# Patient Record
Sex: FEMALE | Race: AMERICAN INDIAN OR ALASKA NATIVE
[De-identification: names, ages, dates, MRNs, and addresses within clinical notes are randomized per-mention and may not be internally consistent; named-entity substitution may affect disease eponyms.]

---

## 2018-08-05 ENCOUNTER — HOSPITAL ENCOUNTER (EMERGENCY)
Dept: HOSPITAL 5 - ED | Age: 38
Discharge: HOME | End: 2018-08-05
Payer: SELF-PAY

## 2018-08-05 VITALS — DIASTOLIC BLOOD PRESSURE: 67 MMHG | SYSTOLIC BLOOD PRESSURE: 118 MMHG

## 2018-08-05 DIAGNOSIS — O21.9: ICD-10-CM

## 2018-08-05 DIAGNOSIS — O99.511: ICD-10-CM

## 2018-08-05 DIAGNOSIS — J45.909: ICD-10-CM

## 2018-08-05 DIAGNOSIS — R10.33: ICD-10-CM

## 2018-08-05 DIAGNOSIS — Z3A.09: ICD-10-CM

## 2018-08-05 DIAGNOSIS — Z88.0: ICD-10-CM

## 2018-08-05 DIAGNOSIS — O26.891: Primary | ICD-10-CM

## 2018-08-05 DIAGNOSIS — R10.32: ICD-10-CM

## 2018-08-05 LAB
BACTERIA #/AREA URNS HPF: (no result) /HPF
BASOPHILS # (AUTO): 0 K/MM3 (ref 0–0.1)
BASOPHILS NFR BLD AUTO: 0.4 % (ref 0–1.8)
BILIRUB UR QL STRIP: (no result)
BLOOD UR QL VISUAL: (no result)
BUN SERPL-MCNC: 9 MG/DL (ref 7–17)
BUN/CREAT SERPL: 18 %
CALCIUM SERPL-MCNC: 9.5 MG/DL (ref 8.4–10.2)
EOSINOPHIL # BLD AUTO: 0.1 K/MM3 (ref 0–0.4)
EOSINOPHIL NFR BLD AUTO: 1.2 % (ref 0–4.3)
HCT VFR BLD CALC: 34.1 % (ref 30.3–42.9)
HEMOLYSIS INDEX: 0
HGB BLD-MCNC: 11.3 GM/DL (ref 10.1–14.3)
LYMPHOCYTES # BLD AUTO: 3 K/MM3 (ref 1.2–5.4)
LYMPHOCYTES NFR BLD AUTO: 25.2 % (ref 13.4–35)
MCH RBC QN AUTO: 32 PG (ref 28–32)
MCHC RBC AUTO-ENTMCNC: 33 % (ref 30–34)
MCV RBC AUTO: 97 FL (ref 79–97)
MONOCYTES # (AUTO): 1 K/MM3 (ref 0–0.8)
MONOCYTES % (AUTO): 8.2 % (ref 0–7.3)
MUCOUS THREADS #/AREA URNS HPF: (no result) /HPF
PH UR STRIP: 7 [PH] (ref 5–7)
PLATELET # BLD: 269 K/MM3 (ref 140–440)
PROT UR STRIP-MCNC: (no result) MG/DL
RBC # BLD AUTO: 3.52 M/MM3 (ref 3.65–5.03)
RBC #/AREA URNS HPF: 2 /HPF (ref 0–6)
UROBILINOGEN UR-MCNC: < 2 MG/DL (ref ?–2)
WBC #/AREA URNS HPF: 1 /HPF (ref 0–6)

## 2018-08-05 PROCEDURE — 76801 OB US < 14 WKS SINGLE FETUS: CPT

## 2018-08-05 PROCEDURE — 81025 URINE PREGNANCY TEST: CPT

## 2018-08-05 PROCEDURE — 81001 URINALYSIS AUTO W/SCOPE: CPT

## 2018-08-05 PROCEDURE — 36415 COLL VENOUS BLD VENIPUNCTURE: CPT

## 2018-08-05 PROCEDURE — 80048 BASIC METABOLIC PNL TOTAL CA: CPT

## 2018-08-05 PROCEDURE — 76817 TRANSVAGINAL US OBSTETRIC: CPT

## 2018-08-05 PROCEDURE — 99284 EMERGENCY DEPT VISIT MOD MDM: CPT

## 2018-08-05 PROCEDURE — 85025 COMPLETE CBC W/AUTO DIFF WBC: CPT

## 2018-08-05 PROCEDURE — 84702 CHORIONIC GONADOTROPIN TEST: CPT

## 2018-08-05 NOTE — EMERGENCY DEPARTMENT REPORT
HPI





- General


Chief Complaint: Abdominal Pain


Time Seen by Provider: 08/05/18 20:15





- HPI


HPI: 








The patient is a 38-year-old female who presents for evaluation of abdominal 

pain, nausea, vomiting.  The patient reports left lower quadrant abdominal pain

, for the past 2 weeks, crampy in quality, currently mild to moderate in 

severity, exacerbated with dry heaving.  She also reports nausea and multiple 

episodes of nonbilious, nonbloody emesis.  She shares that she has a regular 

menstrual periods, and is not sure what her last missed her period was. The 

patient denies fever, chills, night sweats, diarrhea, blood in the stool, dark 

tarry stool, dysuria, hematuria, flank pain, genital discharge, vaginal bleeding

, inability to pass flatus. 








ED Past Medical Hx





- Past Medical History


Hx Asthma: Yes





- Surgical History


Past Surgical History?: No





- Social History


Smoking Status: Never Smoker


Substance Use Type: None





- Medications


Home Medications: 


 Home Medications











 Medication  Instructions  Recorded  Confirmed  Last Taken  Type


 


Acetaminophen [Tylenol] 1,000 mg PO Q6HR #20 tablet 08/05/18  Unknown Rx


 


Pnv No.95/Ferrous Fum/Folic AC 1 each PO QDAY #31 tablet 08/05/18  Unknown Rx





[Prenatal Vitamin Tablet]     


 


Promethazine [Phenergan TAB] 25 mg PO Q8HR PRN #20 tab 08/05/18  Unknown Rx














ED Review of Systems


ROS: 


Stated complaint: ABD PAIN


Other details as noted in HPI





Constitutional: denies: fever


ENT: denies: throat or neck pain


Respiratory: denies: cough, shortness of breath


Cardiovascular: denies: chest pain


Endocrine: denies unexplained weight loss or gain


Gastrointestinal: reports abdominal pain, nausea


Genitourinary: denies: dysuria


Musculoskeletal: denies: leg swelling


Skin: denies: rash


Neurological: denies: headache


Hematological/Lymphatic: denies: easy bleeding or easy bruising  


Psych: denies sadness or hopelessness








Physical Exam





- Physical Exam


Vital Signs: 


 Vital Signs











  08/05/18 08/05/18 08/05/18





  18:12 20:28 20:37


 


Temperature 98.2 F  


 


Pulse Rate 90  91 H


 


Respiratory 16 20 20





Rate   


 


Blood Pressure 117/79  


 


Blood Pressure   121/72





[Left]   


 


O2 Sat by Pulse 100 99 100





Oximetry   














  08/05/18





  21:05


 


Temperature 


 


Pulse Rate 


 


Respiratory 20





Rate 


 


Blood Pressure 


 


Blood Pressure 





[Left] 


 


O2 Sat by Pulse 





Oximetry 











Physical Exam: 








General: well-nourished, well-developed, no acute distress


Head: Normocephalic, atraumatic


Eyes: normal sclera


ENT: Mucous membranes are pink and moist 


Neck: trachea midline, neck supple, No neck stiffness, no cervical adenopathy


Respiratory: Breath sounds equal bilaterally, no wheezing, rales, or rhonchi


Cardio: S1 and S2 present, no murmurs, rubs, gallops, capillary refill is brisk


Abdomen: Normoactive bowel sounds, soft abdomen, periumbilical left lower 

quadrant tenderness to palpation present, no rigidity, no guarding or rebound 

tenderness


Musc: No pitting edema


Skin: No rash


Neuro: no facial drooping, normal speech


Psych: Normal affect








ED Course


 Vital Signs











  08/05/18 08/05/18 08/05/18





  18:12 20:28 20:37


 


Temperature 98.2 F  


 


Pulse Rate 90  91 H


 


Respiratory 16 20 20





Rate   


 


Blood Pressure 117/79  


 


Blood Pressure   121/72





[Left]   


 


O2 Sat by Pulse 100 99 100





Oximetry   














  08/05/18





  21:05


 


Temperature 


 


Pulse Rate 


 


Respiratory 20





Rate 


 


Blood Pressure 


 


Blood Pressure 





[Left] 


 


O2 Sat by Pulse 





Oximetry 














ED Medical Decision Making





- Lab Data


Result diagrams: 


 08/05/18 21:17





 08/05/18 21:17





- Medical Decision Making











The patient was seen and examined by myself.  The patient is placed on a 

cardiac monitor and continuous pulse ox. On initial evaluation, the patient was 

found to be in no distress. Evaluation orders were placed.  The patient was 

given nausea medicine.  Lab results exhibited positive beta hCG and Rh+ blood 

type.  Ultrasound of the pelvis reveals a live intrauterine pregnancy, with 

normal fetal heart rate. The patient was reevaluated and reported that their 

symptoms were markedly improved.  The patient is stable for discharge with 

outpatient follow-up.  The patient is given follow-up and return instructions.  

The patient expressed understanding and agreed with the plan.  The patient is 

discharged in stable condition.


Critical care attestation.: 


If time is entered above; I have spent that time in minutes in the direct care 

of this critically ill patient, excluding procedure time.








ED Disposition


Clinical Impression: 


 Abdominal pain during pregnancy in first trimester, Abdominal pain, acute, 

periumbilical





Disposition: DC-01 TO HOME OR SELFCARE


Is pt being admited?: No


Does the pt Need Aspirin: No


Condition: Stable


Instructions:  Abdominal Pain (ED), Abdominal Pain in Pregnancy  (ED)


Referrals: 


SUKI HOLT MD [Staff Physician] - 3-5 Days


Time of Disposition: 22:57

## 2018-08-05 NOTE — ULTRASOUND REPORT
FINAL REPORT



PROCEDURE:  US OB TRANSVAGINAL



TECHNIQUE:  Real-time transvaginal sonography of the uterus,

placenta, amniotic fluid, adnexa, and fetus was performed with

image documentation. Measurements were obtained to determine

fetal age/size. M-mode Doppler was used to document fetal

heartbeat. CPT 54573



HISTORY:  ab pain, preg 



COMPARISON:  No prior studies are available for comparison.



FINDINGS:  

CRL: 26.1 mm, which corresponds to a gestational age of: 9 weeks,

3 days. 



Yolk Sac: Normal.



Embryonic Cardiac Activity: 177 beats per minute, regular



Gestational Sac: Normal.



Amniotic fluid: Normal.



Cervix: Normal.



Right Ovary: 3.9 x 1.8 x 1.8. Appears normal.



Left Ovary: 3.7 x 2.2 x 2.6. Appears normal. Of 



Estimated delivery date: 03/07/2019



Uterus and adnexa: Normal.



IMPRESSION:  

Single live intrauterine gestation at approximately 9 weeks and 3

days. EDC by US 03/07/2019

## 2018-08-05 NOTE — ULTRASOUND REPORT
FINAL REPORT



PROCEDURE:  US OB &lt; = 14 WEEKS FETUS



TECHNIQUE:  Real-time transabdominal sonography of the uterus,

placenta, amniotic fluid, adnexa, and fetus was performed with

image documentation. Measurements were obtained to determine

fetal age/size. M-mode Doppler was used to document fetal

heartbeat. CPT 05173 



HISTORY:  ab pain, preg 



COMPARISON:  No prior studies are available for comparison.



FINDINGS:  

CRL: 26.1 mm, which corresponds to a gestational age of: 9 weeks,

3 days. 



Yolk Sac: Normal.



Embryonic Cardiac Activity: 177 beats per minute, regular



Gestational Sac: Normal.



Amniotic fluid: Normal.



Cervix: Normal.



Right Ovary: 3.9 x 1.8 x 1.8. Appears normal.



Left Ovary: 3.7 x 2.2 x 2.6. Appears normal.



Estimated delivery date: 03/07/2019



Uterus and adnexa: Normal.



IMPRESSION:  

Single live intrauterine gestation at approximately 9 weeks and 3

days. EDC by  03/07/2019